# Patient Record
Sex: FEMALE | Race: ASIAN | NOT HISPANIC OR LATINO | Employment: OTHER | URBAN - METROPOLITAN AREA
[De-identification: names, ages, dates, MRNs, and addresses within clinical notes are randomized per-mention and may not be internally consistent; named-entity substitution may affect disease eponyms.]

---

## 2020-08-02 ENCOUNTER — HOSPITAL ENCOUNTER (EMERGENCY)
Facility: HOSPITAL | Age: 35
Discharge: HOME | End: 2020-08-02
Attending: EMERGENCY MEDICINE | Admitting: EMERGENCY MEDICINE
Payer: COMMERCIAL

## 2020-08-02 VITALS
RESPIRATION RATE: 16 BRPM | SYSTOLIC BLOOD PRESSURE: 147 MMHG | HEART RATE: 95 BPM | DIASTOLIC BLOOD PRESSURE: 86 MMHG | TEMPERATURE: 98.5 F | OXYGEN SATURATION: 95 %

## 2020-08-02 DIAGNOSIS — L30.1 DYSHIDROTIC ECZEMA: Primary | ICD-10-CM

## 2020-08-02 PROCEDURE — 99282 EMERGENCY DEPT VISIT SF MDM: CPT | Performed by: EMERGENCY MEDICINE

## 2020-08-02 PROCEDURE — 99282 EMERGENCY DEPT VISIT SF MDM: CPT

## 2020-08-02 RX ORDER — HYDROCORTISONE 25 MG/ML
LOTION TOPICAL 2 TIMES DAILY
Qty: 50 ML | Refills: 0 | Status: SHIPPED | OUTPATIENT
Start: 2020-08-02

## 2020-08-02 NOTE — ED ATTENDING ATTESTATION
8/2/2020  IJaxson MD, saw and evaluated the patient  I have discussed the patient with the resident/non-physician practitioner and agree with the resident's/non-physician practitioner's findings, Plan of Care, and MDM as documented in the resident's/non-physician practitioner's note, except where noted  All available labs and Radiology studies were reviewed  I was present for key portions of any procedure(s) performed by the resident/non-physician practitioner and I was immediately available to provide assistance  At this point I agree with the current assessment done in the Emergency Department  I have conducted an independent evaluation of this patient a history and physical is as follows:    37 y/o F presents for evaluation of itchy rash to bilateral hands x 2 weeks after using new hand soap and gloves  Denies rash to other areas  No n/v/f/c, cp, sob  10 systems reviewed and otherwise neg  On exam no distress, lungs nml, cardiac nml, abd nml, skin exam: please refer to media images    MDM:  Rash c/w dishydrotic eczema will tx with steroids, moisturizing cream, dc offending agents, pcp f/u      ED Course         Critical Care Time  Procedures

## 2020-08-02 NOTE — DISCHARGE INSTRUCTIONS
Please apply hydrocortisone cream topically to the hands as directed in addition to topical barrier relief such as Aquaphor lotion  Use latex gloves at work  Avoid using nitryl gloves and fragranced soaps  Please follow up with Dermatology if symptoms persist     Return to the ED for any new or worsening symptoms

## 2020-08-02 NOTE — ED PROVIDER NOTES
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  She notes that since reopening, she has been using nitryl gloves and fragranced soap which she had not used previously, stating that she was using latex gloves and antibiotic soap prior to the salon's closure and re-opening  Patient relates that initially she had experienced itching in all of her fingers which has since resolved  She admits to applying cortisone topically to the digits without much relief  She states that has soaked her fingers in hydrogen peroxide also without much relief  Patient relates that on Thursday she had noticed small vesicles on the 3rd and 4th digits of the left hand which began to drain clear fluid which has since resolved  Denied any purulent drainage, numbness or weakness in the digits  She offers no constitutional complaints  History provided by:  Patient  Rash   Location:  Finger  Finger rash location: all fingers  Quality: blistering and dryness    Severity:  Moderate  Onset quality:  Gradual  Timing:  Constant  Progression:  Improving  Chronicity:  New  Context: new detergent/soap    Relieved by:  Nothing  Ineffective treatments:  Topical steroids  Associated symptoms: no fatigue, no fever and no joint pain        None       History reviewed  No pertinent past medical history  Past Surgical History:   Procedure Laterality Date    APPENDECTOMY         History reviewed  No pertinent family history  I have reviewed and agree with the history as documented      E-Cigarette/Vaping     E-Cigarette/Vaping Substances     Social History     Tobacco Use    Smoking status: Never Smoker    Smokeless tobacco: Never Used   Substance Use Topics    Alcohol use: Never     Frequency: Never    Drug use: Never       Review of Systems   Constitutional: Negative for chills, fatigue and fever  Musculoskeletal: Negative for arthralgias and joint swelling  Skin: Positive for rash  Neurological: Negative for weakness and numbness  All other systems reviewed and are negative  Physical Exam  Physical Exam  Vitals signs and nursing note reviewed  Constitutional:       General: She is not in acute distress  Appearance: Normal appearance  She is not toxic-appearing  Comments: Pleasant and well-appearing 38year-old female, appearing in no significant distress   HENT:      Head: Normocephalic and atraumatic  Eyes:      Extraocular Movements: Extraocular movements intact  Conjunctiva/sclera: Conjunctivae normal    Neck:      Musculoskeletal: Normal range of motion and neck supple  Cardiovascular:      Rate and Rhythm: Normal rate and regular rhythm  Pulses: Normal pulses  Pulmonary:      Effort: Pulmonary effort is normal       Breath sounds: Normal breath sounds  Musculoskeletal: Normal range of motion  Skin:     General: Skin is warm and dry  Capillary Refill: Capillary refill takes less than 2 seconds  Findings: Rash present  Comments: Desquamation involving nearly all digits of both hands  There is no active drainage, warmth, or erythema  No vesicles  No streaking  Photos posted in media tab  Neurological:      General: No focal deficit present  Mental Status: She is alert  Mental status is at baseline           Vital Signs  ED Triage Vitals [08/02/20 1611]   Temperature Pulse Respirations Blood Pressure SpO2   98 5 °F (36 9 °C) 95 16 147/86 95 %      Temp Source Heart Rate Source Patient Position - Orthostatic VS BP Location FiO2 (%)   Temporal Monitor Sitting Left arm --      Pain Score       --           Vitals: 08/02/20 1611   BP: 147/86   Pulse: 95   Patient Position - Orthostatic VS: Sitting         Visual Acuity      ED Medications  Medications - No data to display    Diagnostic Studies  Results Reviewed     None                 No orders to display              Procedures  Procedures         ED Course  ED Course as of Aug 02 1745   Sun Aug 02, 2020   1658 Patient evaluated, care plan discussed with attending physician  Will plan for treatment with topical steroids, topical moisture barrier, and avoidance of fragranced soap and nitryl gloves                MDM  Number of Diagnoses or Management Options  Dyshidrotic eczema: new and does not require workup  Diagnosis management comments: This is an otherwise healthy and well-appearing 38year-old female presenting ambulatory to the emergency department for evaluation of a rash that developed on the digits of both hands just over one week prior to presentation  Patient endorses she is the owner of a nail salon and had just reopened the salon on July 19th, at which point she had used fragranced soap that was brought in by an employee as well as nitryl gloves  Patient states that prior to salon's closure and reopening she had been using latex gloves and antibiotic soap  Patient developed blistering on both hands and peeling of the skin, which was initially associated with itching which has since resolved  She admits to vesicles on the outer ends of the digits that expressed clear fluid which has since resolved  Patient denies any purulent discharge  On exam, patient appears in no significant distress  There is desquamation in all digits without erythema, warmth, or purulent drainage, streaking  Sensation intact in all digits with 5/5 strength and capillary refill intact       Differential diagnosis includes but not limited to: dyshidrotic eczema, contact dermatitis, allergic reaction; cellulitis not present    Initial ED plan: discharge with hydrocortisone rx and Derm follow up    Final ED Assessment: History and examination consistent with dyshidrotic eczema  Patient advised of the diagnosis with plan for treatment with topical hydrocortisone and Aquaphor lotion as a barrier  Patient also advised to continue use of latex gloves and to avoid use of nitryl gloves and fragranced soap  Patient understanding and agreeable  Patient given dermatology follow up and advised to schedule an appointment in a week if symptoms persist  Return parameters discussed at length  All questions answered to satisfaction  Patient stable for discharge home in good condition  Amount and/or Complexity of Data Reviewed  Review and summarize past medical records: yes  Discuss the patient with other providers: yes  Independent visualization of images, tracings, or specimens: yes    Risk of Complications, Morbidity, and/or Mortality  Presenting problems: low  Diagnostic procedures: low  Management options: low    Patient Progress  Patient progress: stable        Disposition  Final diagnoses:   Dyshidrotic eczema     Time reflects when diagnosis was documented in both MDM as applicable and the Disposition within this note     Time User Action Codes Description Comment    8/2/2020  5:14 PM Francisco J Walden Add [L30 1] Dyshidrotic eczema       ED Disposition     ED Disposition Condition Date/Time Comment    Discharge Stable Sun Aug 2, 2020  5:14 PM Wes Villegas discharge to home/self care              Follow-up Information     Follow up With Specialties Details Why Contact Info Additional Information    [address] [facility] Emergency Medicine  If symptoms worsen [facility] [address] [phone] [place] ED, [address] [facility] [place], [place], [address] [facility] Dermatology  As needed [address] [facility] [address] [facility] [address] [phone] [address] [place], [address]   [phone]    Your Primary Care Provider   As needed            Discharge Medication List as of 8/2/2020  5:18 PM      START taking these medications    Details   hydrocortisone 2 5 % lotion Apply topically 2 (two) times a day, Starting Sun 8/2/2020, Normal           No discharge procedures on file      PDMP Review     None          ED Provider  Electronically Signed by           Larry Li PA-C  08/02/20 4997

## 2020-08-10 ENCOUNTER — HOSPITAL ENCOUNTER (EMERGENCY)
Facility: HOSPITAL | Age: 35
Discharge: HOME | End: 2020-08-10
Attending: EMERGENCY MEDICINE | Admitting: EMERGENCY MEDICINE
Payer: COMMERCIAL

## 2020-08-10 VITALS
RESPIRATION RATE: 18 BRPM | OXYGEN SATURATION: 96 % | WEIGHT: 183.64 LBS | TEMPERATURE: 98.2 F | DIASTOLIC BLOOD PRESSURE: 97 MMHG | SYSTOLIC BLOOD PRESSURE: 143 MMHG | HEART RATE: 103 BPM

## 2020-08-10 DIAGNOSIS — L30.1 DYSHIDROTIC ECZEMA: Primary | ICD-10-CM

## 2020-08-10 PROCEDURE — 99282 EMERGENCY DEPT VISIT SF MDM: CPT

## 2020-08-10 PROCEDURE — 99284 EMERGENCY DEPT VISIT MOD MDM: CPT | Performed by: EMERGENCY MEDICINE

## 2020-08-10 RX ORDER — PREDNISONE 20 MG/1
40 TABLET ORAL DAILY
Qty: 10 TABLET | Refills: 0 | Status: SHIPPED | OUTPATIENT
Start: 2020-08-10 | End: 2020-08-15

## 2020-08-11 NOTE — ED PROVIDER NOTES
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  Patient states since then she has had a progressively worsening rash to her hands  Rash initially begins as blisters, which then open and skin begins to peel  Patient describes the rash as itchy  Patient otherwise denies fever, chills, chest pain, shortness of breath, abdominal pain, nausea, vomiting, diarrhea and peripheral edema  Patient was seen in the ED on 8/2 for the symptoms and started on hydrocortisone cream which she has been using  Patient has also been applying Aquaphor to her hands  Patient has noticed some improvement in the rash, however states the itching is worse  Patient has attempted to contact several dermatologists and has been unable to schedule an appointment in the near future  A/P:  Dyshidrotic eczema, recommend patient continue using the Aquaphor  Will also provide prednisone burst   Additional dermatology contact information provided  History provided by:  Patient and medical records      Prior to Admission Medications   Prescriptions Last Dose Informant Patient Reported? Taking?   hydrocortisone 2 5 % lotion   No No   Sig: Apply topically 2 (two) times a day      Facility-Administered Medications: None       History reviewed  No pertinent past medical history  Past Surgical History:   Procedure Laterality Date    APPENDECTOMY         History reviewed  No pertinent family history  I have reviewed and agree with the history as documented      E-Cigarette/Vaping     E-Cigarette/Vaping Substances     Social History     Tobacco Use    Smoking status: Never Smoker    Smokeless tobacco: Never Used   Substance Use Topics    Alcohol use: Never     Frequency: Never    Drug use: Never       Review of Systems   Skin: Positive for rash  All other systems reviewed and are negative  Physical Exam  Physical Exam  General Appearance: alert and oriented, nad, non toxic appearing  Skin:  Warm, dry  Scaly rash appreciated to the dorsal and ventral surfaces of the bilateral hands, with areas of desquamation  No active bleeding or drainage appreciated  HEENT: atraumatic, normocephalic  Neck: Supple, trachea midline  Cardiac: RRR; no murmurs, rub, gallops  Pulmonary: lungs CTAB; no wheezes, rales, rhonchi  Extremities:  no pedal edema, 2+ pulses; no calf tenderness, no clubbing, no cyanosis  Neuro:  no focal motor or sensory deficits, CN 2-12 grossly intact  Psych:  Normal mood and affect, normal judgement and insight      Vital Signs  ED Triage Vitals   Temperature Pulse Respirations Blood Pressure SpO2   08/10/20 2247 08/10/20 2244 08/10/20 2244 08/10/20 2244 08/10/20 2244   98 2 °F (36 8 °C) 103 18 143/97 96 %      Temp Source Heart Rate Source Patient Position - Orthostatic VS BP Location FiO2 (%)   08/10/20 2247 08/10/20 2244 08/10/20 2244 08/10/20 2244 --   Oral Monitor Sitting Right arm       Pain Score       08/10/20 2244       No Pain           Vitals:    08/10/20 2244   BP: 143/97   Pulse: 103   Patient Position - Orthostatic VS: Sitting         Visual Acuity      ED Medications  Medications - No data to display    Diagnostic Studies  Results Reviewed     None                 No orders to display              Procedures  Procedures         ED Course       US AUDIT      Most Recent Value   Initial Alcohol Screen: US AUDIT-C    1  How often do you have a drink containing alcohol?  0 Filed at: 08/10/2020 2248   2  How many drinks containing alcohol do you have on a typical day you are drinking? 0 Filed at: 08/10/2020 2248   3a  Male UNDER 65:  How often do you have five or more drinks on one occasion? 0 Filed at: 08/10/2020 2248   3b  FEMALE Any Age, or MALE 65+: How often do you have 4 or more drinks on one occassion? 0 Filed at: 08/10/2020 2248   Audit-C Score  0 Filed at: 08/10/2020 2248                  KARIE/DAST-10      Most Recent Value   How many times in the past year have you    Used an illegal drug or used a prescription medication for non-medical reasons? Never Filed at: 08/10/2020 2248                                MDM      Disposition  Final diagnoses:   Dyshidrotic eczema     Time reflects when diagnosis was documented in both MDM as applicable and the Disposition within this note     Time User Action Codes Description Comment    8/10/2020 10:58 PM Luis, [address] [L30 1] Dyshydrosis     8/10/2020 10:58 PM Chichi Smith Remove [L30 1] Dyshydrosis     8/10/2020 10:59 PM Shruthi Brown Add [L30 1] Dyshidrotic eczema       ED Disposition     ED Disposition Condition Date/Time Comment    Discharge Stable Mon Aug 10, 2020 10:58 PM Vidal Goodell discharge to home/self care  Follow-up Information     Follow up With Specialties Details Why [address] [facility],  Dermatology, Internal Medicine Schedule an appointment as soon as possible for a visit  For re-evaluation [address]  [phone]            Discharge Medication List as of 8/10/2020 11:00 PM      START taking these medications    Details   predniSONE 20 mg tablet Take 2 tablets (40 mg total) by mouth daily for 5 days, Starting Mon 8/10/2020, Until Sat 8/15/2020, Normal         CONTINUE these medications which have NOT CHANGED    Details   hydrocortisone 2 5 % lotion Apply topically 2 (two) times a day, Starting Sun 8/2/2020, Normal           No discharge procedures on file      PDMP Review     None          ED Provider  Electronically Signed by           Jason Sprague DO  08/10/20 4526

## 2020-08-23 ENCOUNTER — HOSPITAL ENCOUNTER (EMERGENCY)
Facility: HOSPITAL | Age: 35
Discharge: HOME | End: 2020-08-23
Attending: EMERGENCY MEDICINE
Payer: COMMERCIAL

## 2020-08-23 VITALS
DIASTOLIC BLOOD PRESSURE: 96 MMHG | WEIGHT: 177.47 LBS | OXYGEN SATURATION: 98 % | TEMPERATURE: 98.7 F | HEART RATE: 90 BPM | SYSTOLIC BLOOD PRESSURE: 160 MMHG | RESPIRATION RATE: 16 BRPM

## 2020-08-23 DIAGNOSIS — L30.9 DERMATITIS: Primary | ICD-10-CM

## 2020-08-23 PROCEDURE — 99282 EMERGENCY DEPT VISIT SF MDM: CPT

## 2020-08-23 PROCEDURE — 99284 EMERGENCY DEPT VISIT MOD MDM: CPT | Performed by: EMERGENCY MEDICINE

## 2020-08-23 RX ORDER — PREDNISONE 20 MG/1
20 TABLET ORAL 2 TIMES DAILY WITH MEALS
Qty: 10 TABLET | Refills: 0 | Status: SHIPPED | OUTPATIENT
Start: 2020-08-23 | End: 2020-08-28

## 2020-08-23 RX ORDER — CLOTRIMAZOLE 1 %
1 CREAM (GRAM) TOPICAL 2 TIMES DAILY
Qty: 30 G | Refills: 0 | Status: SHIPPED | OUTPATIENT
Start: 2020-08-23 | End: 2020-09-06

## 2020-08-23 RX ORDER — DIPHENHYDRAMINE HCL 25 MG
25 TABLET ORAL EVERY 6 HOURS PRN
Qty: 30 TABLET | Refills: 0 | Status: SHIPPED | OUTPATIENT
Start: 2020-08-23

## 2020-08-23 NOTE — ED PROVIDER NOTES
History  Chief Complaint   Patient presents with    Rash     patient with eczema  states recently here and put on steroids  after returning to work and off steroids rash has returned     The patient is a 79-year-old female who presents for evaluation of hand rash  She has been seen here before and prescribe hydrocortisone which does not seem to work for this rash  She does report that previously prescribed steroids seem to help  She is complaining cracking erythema and severe pruritus of the bilateral hands  She works in a nail salon wears gloves all day  Rash   Associated symptoms: no fatigue, no myalgias and no sore throat        Prior to Admission Medications   Prescriptions Last Dose Informant Patient Reported? Taking?   hydrocortisone 2 5 % lotion   No No   Sig: Apply topically 2 (two) times a day      Facility-Administered Medications: None       History reviewed  No pertinent past medical history  Past Surgical History:   Procedure Laterality Date    APPENDECTOMY         History reviewed  No pertinent family history  I have reviewed and agree with the history as documented  E-Cigarette/Vaping     E-Cigarette/Vaping Substances     Social History     Tobacco Use    Smoking status: Never Smoker    Smokeless tobacco: Never Used   Substance Use Topics    Alcohol use: Never     Frequency: Never    Drug use: Never       Review of Systems   Constitutional: Negative for activity change, appetite change and fatigue  HENT: Negative for nosebleeds, sneezing, sore throat, trouble swallowing and voice change  Eyes: Negative for photophobia, pain and visual disturbance  Respiratory: Negative for apnea, choking and stridor  Cardiovascular: Negative for palpitations and leg swelling  Gastrointestinal: Negative for anal bleeding and constipation  Endocrine: Negative for cold intolerance, heat intolerance, polydipsia and polyphagia     Genitourinary: Negative for decreased urine volume, enuresis, frequency, genital sores and urgency  Musculoskeletal: Negative for joint swelling and myalgias  Skin: Positive for rash  Allergic/Immunologic: Negative for environmental allergies and food allergies  Neurological: Negative for tremors, seizures, speech difficulty and weakness  Hematological: Negative for adenopathy  Psychiatric/Behavioral: Negative for behavioral problems, decreased concentration, dysphoric mood and hallucinations  All other systems reviewed and are negative  Physical Exam  Physical Exam  Vitals signs reviewed  Constitutional:       General: She is not in acute distress  Appearance: She is well-developed  She is not diaphoretic  HENT:      Head: Normocephalic and atraumatic  Right Ear: External ear normal       Left Ear: External ear normal       Nose: Nose normal    Eyes:      Conjunctiva/sclera: Conjunctivae normal       Pupils: Pupils are equal, round, and reactive to light  Neck:      Musculoskeletal: Normal range of motion and neck supple  Cardiovascular:      Rate and Rhythm: Normal rate and regular rhythm  Heart sounds: Normal heart sounds  No murmur  No friction rub  No gallop  Pulmonary:      Effort: Pulmonary effort is normal  No respiratory distress  Breath sounds: Normal breath sounds  No wheezing  Abdominal:      General: Bowel sounds are normal       Palpations: Abdomen is soft  Skin:     General: Skin is warm and dry  Comments: Bilateral hand cracking, lichenification, redness and dry skin  Questionable fungal infection vs eczema  Neurological:      Mental Status: She is alert and oriented to person, place, and time     Psychiatric:         Behavior: Behavior normal          Vital Signs  ED Triage Vitals [08/23/20 1200]   Temperature Pulse Respirations Blood Pressure SpO2   98 7 °F (37 1 °C) 90 16 160/96 98 %      Temp Source Heart Rate Source Patient Position - Orthostatic VS BP Location FiO2 (%)   Oral Monitor Sitting Right arm --      Pain Score       --           Vitals:    08/23/20 1200   BP: 160/96   Pulse: 90   Patient Position - Orthostatic VS: Sitting         Visual Acuity      ED Medications  Medications - No data to display    Diagnostic Studies  Results Reviewed     None                 No orders to display              Procedures  Procedures         ED Course                                             MDM      Disposition  Final diagnoses:   Dermatitis     Time reflects when diagnosis was documented in both MDM as applicable and the Disposition within this note     Time User Action Codes Description Comment    8/23/2020 12:16 PM Bk Barker Add [L30 9] Dermatitis       ED Disposition     ED Disposition Condition Date/Time Comment    Discharge Stable Sun Aug 23, 2020 12:16 PM Regino Kay discharge to home/self care  Follow-up Information    None         Patient's Medications   Discharge Prescriptions    CLOTRIMAZOLE (LOTRIMIN) 1 % CREAM    Apply 1 g (1 application total) topically 2 (two) times a day for 14 days       Start Date: 8/23/2020 End Date: 9/6/2020       Order Dose: 1 application       Quantity: 30 g    Refills: 0    DIPHENHYDRAMINE (BENADRYL) 25 MG TABLET    Take 1 tablet (25 mg total) by mouth every 6 (six) hours as needed for itching       Start Date: 8/23/2020 End Date: --       Order Dose: 25 mg       Quantity: 30 tablet    Refills: 0    PREDNISONE 20 MG TABLET    Take 1 tablet (20 mg total) by mouth 2 (two) times a day with meals for 5 days       Start Date: 8/23/2020 End Date: 8/28/2020       Order Dose: 20 mg       Quantity: 10 tablet    Refills: 0     No discharge procedures on file      PDMP Review     None          ED Provider  Electronically Signed by           Veronica Lauren PA-C  08/23/20 8364

## 2020-12-22 ENCOUNTER — OFFICE VISIT (OUTPATIENT)
Dept: URGENT CARE | Facility: MEDICAL CENTER | Age: 35
End: 2020-12-22
Payer: COMMERCIAL

## 2020-12-22 VITALS — OXYGEN SATURATION: 98 % | HEART RATE: 73 BPM | RESPIRATION RATE: 16 BRPM | TEMPERATURE: 96.6 F

## 2020-12-22 DIAGNOSIS — Z20.822 EXPOSURE TO COVID-19 VIRUS: Primary | ICD-10-CM

## 2020-12-22 PROCEDURE — 99213 OFFICE O/P EST LOW 20 MIN: CPT | Performed by: FAMILY MEDICINE

## 2020-12-22 PROCEDURE — U0003 INFECTIOUS AGENT DETECTION BY NUCLEIC ACID (DNA OR RNA); SEVERE ACUTE RESPIRATORY SYNDROME CORONAVIRUS 2 (SARS-COV-2) (CORONAVIRUS DISEASE [COVID-19]), AMPLIFIED PROBE TECHNIQUE, MAKING USE OF HIGH THROUGHPUT TECHNOLOGIES AS DESCRIBED BY CMS-2020-01-R: HCPCS | Performed by: FAMILY MEDICINE

## 2020-12-22 PROCEDURE — S9088 SERVICES PROVIDED IN URGENT: HCPCS | Performed by: FAMILY MEDICINE

## 2020-12-24 LAB — SARS-COV-2 RNA SPEC QL NAA+PROBE: DETECTED

## 2024-12-22 ENCOUNTER — APPOINTMENT (EMERGENCY)
Dept: RADIOLOGY | Facility: HOSPITAL | Age: 39
End: 2024-12-22
Payer: COMMERCIAL

## 2024-12-22 ENCOUNTER — HOSPITAL ENCOUNTER (EMERGENCY)
Facility: HOSPITAL | Age: 39
Discharge: HOME | End: 2024-12-22
Attending: EMERGENCY MEDICINE
Payer: COMMERCIAL

## 2024-12-22 VITALS
RESPIRATION RATE: 18 BRPM | HEART RATE: 89 BPM | DIASTOLIC BLOOD PRESSURE: 73 MMHG | TEMPERATURE: 98.2 F | OXYGEN SATURATION: 98 % | SYSTOLIC BLOOD PRESSURE: 148 MMHG | WEIGHT: 203.04 LBS

## 2024-12-22 DIAGNOSIS — R05.9 COUGH: Primary | ICD-10-CM

## 2024-12-22 DIAGNOSIS — R07.89 CHEST WALL PAIN: ICD-10-CM

## 2024-12-22 LAB
EXT PREGNANCY TEST URINE: NEGATIVE
EXT. CONTROL: NORMAL
FLUAV AG UPPER RESP QL IA.RAPID: NEGATIVE
FLUBV AG UPPER RESP QL IA.RAPID: NEGATIVE
SARS-COV+SARS-COV-2 AG RESP QL IA.RAPID: NEGATIVE

## 2024-12-22 PROCEDURE — 71046 X-RAY EXAM CHEST 2 VIEWS: CPT

## 2024-12-22 PROCEDURE — 87811 SARS-COV-2 COVID19 W/OPTIC: CPT | Performed by: EMERGENCY MEDICINE

## 2024-12-22 PROCEDURE — 87804 INFLUENZA ASSAY W/OPTIC: CPT | Performed by: EMERGENCY MEDICINE

## 2024-12-22 PROCEDURE — 96372 THER/PROPH/DIAG INJ SC/IM: CPT

## 2024-12-22 PROCEDURE — 99284 EMERGENCY DEPT VISIT MOD MDM: CPT | Performed by: EMERGENCY MEDICINE

## 2024-12-22 PROCEDURE — 81025 URINE PREGNANCY TEST: CPT | Performed by: EMERGENCY MEDICINE

## 2024-12-22 PROCEDURE — 99283 EMERGENCY DEPT VISIT LOW MDM: CPT

## 2024-12-22 RX ORDER — KETOROLAC TROMETHAMINE 30 MG/ML
30 INJECTION, SOLUTION INTRAMUSCULAR; INTRAVENOUS ONCE
Status: COMPLETED | OUTPATIENT
Start: 2024-12-22 | End: 2024-12-22

## 2024-12-22 RX ORDER — AZITHROMYCIN 250 MG/1
TABLET, FILM COATED ORAL
Qty: 6 TABLET | Refills: 0 | Status: SHIPPED | OUTPATIENT
Start: 2024-12-22 | End: 2024-12-26

## 2024-12-22 RX ORDER — NAPROXEN 500 MG/1
500 TABLET ORAL 2 TIMES DAILY WITH MEALS
Qty: 30 TABLET | Refills: 0 | Status: SHIPPED | OUTPATIENT
Start: 2024-12-22

## 2024-12-22 RX ORDER — LIDOCAINE 50 MG/G
1 PATCH TOPICAL ONCE
Status: DISCONTINUED | OUTPATIENT
Start: 2024-12-22 | End: 2024-12-22 | Stop reason: HOSPADM

## 2024-12-22 RX ORDER — DEXTROMETHORPHAN HYDROBROMIDE AND PROMETHAZINE HYDROCHLORIDE 15; 6.25 MG/5ML; MG/5ML
5 SYRUP ORAL 4 TIMES DAILY PRN
Qty: 180 ML | Refills: 0 | Status: SHIPPED | OUTPATIENT
Start: 2024-12-22

## 2024-12-22 RX ADMIN — LIDOCAINE 1 PATCH: 50 PATCH TOPICAL at 20:40

## 2024-12-22 RX ADMIN — KETOROLAC TROMETHAMINE 30 MG: 30 INJECTION, SOLUTION INTRAMUSCULAR; INTRAVENOUS at 20:40

## 2024-12-23 NOTE — ED PROVIDER NOTES
Pt Name: Angle Santizo  MRN: [id number]  Birthdate 1985  Age/Sex: 39 y.o. female  Date of evaluation: 12/22/2024  PCP: No primary care provider on file.        FINAL IMPRESSION    Final diagnoses:   Cough   Chest wall pain         DISPOSITION/PLAN    Time reflects when diagnosis was documented in both MDM as applicable and the Disposition within this note       Time User Action Codes Description Comment    12/22/2024  9:08 PM Suad Hogan Add [R05.9] Cough     12/22/2024  9:08 PM Suad Hogan Add [R07.89] Chest wall pain           ED Disposition       ED Disposition   Discharge    Condition   Stable    Date/Time   Sun Dec 22, 2024  9:08 PM    Comment   Angle Santizo discharge to home/self care.                   Follow-up Information       Follow up With Specialties Details Why Contact Info Additional Information    [facility] Emergency Department Emergency Medicine  As needed, If symptoms worsen [address] [place] [id number]  [phone] [facility] Emergency Department, [address] [place] [address], [facility]              PATIENT REFERRED TO:    [facility] Emergency Department  [address] [place] [id number]  [phone]    As needed, If symptoms worsen      DISCHARGE MEDICATIONS:    Discharge Medication List as of 12/22/2024  9:09 PM        START taking these medications    Details   azithromycin (ZITHROMAX) 250 mg tablet Take 2 tablets today then 1 tablet daily x 4 days, Normal      naproxen (NAPROSYN) 500 mg tablet Take 1 tablet (500 mg total) by mouth 2 (two) times a day with meals, Starting Sun 12/22/2024, Normal           CONTINUE these medications which have NOT CHANGED    Details   clotrimazole (LOTRIMIN) 1 % cream Apply 1 g (1 application total) topically 2 (two) times a day for 14 days, Starting Sun 8/23/2020, Until Sun 9/6/2020, Print      diphenhydrAMINE (BENADRYL) 25 mg tablet Take 1 tablet (25 mg total)  by mouth every 6 (six) hours as needed for itching, Starting Sun 8/23/2020, Print      hydrocortisone 2.5 % lotion Apply topically 2 (two) times a day, Starting Sun 8/2/2020, Normal             No discharge procedures on file.          CHIEF COMPLAINT    Chief Complaint   Patient presents with    Cough     Pt reports productive cough for the past few weeks that is getting worse. Pain starting today under L breast. Also reports sob.         HPI    Angle presents to the Emergency Department complaining of left sided lateral chest wall pain with cough and with movement.  She has had an ongoing cough for two weeks and has been taking OTC medications.    Today the pain seems worse.        HPI      Past Medical and Surgical History    History reviewed. No pertinent past medical history.    Past Surgical History:   Procedure Laterality Date    APPENDECTOMY         History reviewed. No pertinent family history.    Social History     Tobacco Use    Smoking status: Never    Smokeless tobacco: Never   Substance Use Topics    Alcohol use: Never    Drug use: Never         .    Allergies    No Known Allergies    Home Medications    Prior to Admission medications    Medication Sig Start Date End Date Taking? Authorizing Provider   clotrimazole (LOTRIMIN) 1 % cream Apply 1 g (1 application total) topically 2 (two) times a day for 14 days 8/23/20 9/6/20  Blas COOK PA-C   diphenhydrAMINE (BENADRYL) 25 mg tablet Take 1 tablet (25 mg total) by mouth every 6 (six) hours as needed for itching 8/23/20   Blas COOK PA-C   hydrocortisone 2.5 % lotion Apply topically 2 (two) times a day 8/2/20   Dania Chatman PA-C           Review of Systems    Review of Systems   Constitutional:  Negative for chills and fever.   HENT:  Negative for ear pain and sore throat.    Eyes:  Negative for pain and visual disturbance.   Respiratory:  Positive for cough and chest tightness. Negative for shortness of breath.    Cardiovascular:  Negative for  chest pain and palpitations.   Gastrointestinal:  Negative for abdominal pain and vomiting.   Genitourinary:  Negative for dysuria and hematuria.   Musculoskeletal:  Negative for arthralgias and back pain.   Skin:  Negative for color change and rash.   Neurological:  Negative for seizures and syncope.   All other systems reviewed and are negative.      Physical Exam      ED Triage Vitals   Temperature Pulse Respirations Blood Pressure SpO2   12/22/24 2003 12/22/24 2003 12/22/24 2003 12/22/24 2003 12/22/24 2003   98.2 °F (36.8 °C) 89 18 148/73 98 %      Temp Source Heart Rate Source Patient Position - Orthostatic VS BP Location FiO2 (%)   12/22/24 2003 12/22/24 2003 12/22/24 2003 12/22/24 2003 --   Oral Monitor Sitting Right arm       Pain Score       12/22/24 2040       7               Physical Exam  Vitals and nursing note reviewed.   Constitutional:       General: She is not in acute distress.     Appearance: She is well-developed.   HENT:      Head: Normocephalic and atraumatic.   Eyes:      Conjunctiva/sclera: Conjunctivae normal.   Cardiovascular:      Rate and Rhythm: Normal rate and regular rhythm.      Heart sounds: No murmur heard.  Pulmonary:      Effort: Pulmonary effort is normal. No respiratory distress.      Breath sounds: Normal breath sounds.   Chest:      Chest wall: Tenderness present. No mass, lacerations, deformity or swelling.       Abdominal:      Palpations: Abdomen is soft.      Tenderness: There is no abdominal tenderness.   Musculoskeletal:         General: No swelling.      Cervical back: Neck supple.   Skin:     General: Skin is warm and dry.      Capillary Refill: Capillary refill takes less than 2 seconds.   Neurological:      Mental Status: She is alert.   Psychiatric:         Mood and Affect: Mood normal.         Assessment and Plan    Angle Santizo is a 39 y.o. female who presents with cough and chest wall pain. Physical examination remarkable for tenderness to palpation. Differential  diagnosis (not completely inclusive) includes viral vs pneumonia/ bacterial with associated chest wall strain/ possible rib fracture. Plan will be to perform diagnostic testing and treat symptomatically.      MDM      Diagnostic Results      Labs:    Results for orders placed or performed during the hospital encounter of 12/22/24   POCT pregnancy, urine    Collection Time: 12/22/24  8:20 PM   Result Value Ref Range    EXT Preg Test, Ur Negative     Control Valid    FLU/COVID Rapid Antigen (30 min. TAT) - Preferred screening test in ED    Collection Time: 12/22/24  8:39 PM    Specimen: Nose; Nares   Result Value Ref Range    SARS COV Rapid Antigen Negative Negative    Influenza A Rapid Antigen Negative Negative    Influenza B Rapid Antigen Negative Negative       All labs reviewed and utilized in the medical decision making process    Radiology:    XR chest 2 views    (Results Pending)     No obvious infiltrate or rib fracture    All radiology studies independently viewed by me and interpreted by the radiologist.    Procedure    Procedures      ED Course of Care and Re-Assessments      Medications   lidocaine (LIDODERM) 5 % patch 1 patch (1 patch Topical Medication Applied 12/22/24 2040)   ketorolac (TORADOL) injection 30 mg (30 mg Intramuscular Given 12/22/24 2040)                  Suad Hogan, DO     Suad Hogan, DO  12/22/24 2135